# Patient Record
Sex: FEMALE | Race: WHITE | NOT HISPANIC OR LATINO | Employment: STUDENT | URBAN - METROPOLITAN AREA
[De-identification: names, ages, dates, MRNs, and addresses within clinical notes are randomized per-mention and may not be internally consistent; named-entity substitution may affect disease eponyms.]

---

## 2023-09-04 ENCOUNTER — OFFICE VISIT (OUTPATIENT)
Dept: URGENT CARE | Facility: CLINIC | Age: 23
End: 2023-09-04
Payer: COMMERCIAL

## 2023-09-04 VITALS
DIASTOLIC BLOOD PRESSURE: 55 MMHG | RESPIRATION RATE: 19 BRPM | TEMPERATURE: 98 F | OXYGEN SATURATION: 99 % | HEART RATE: 75 BPM | SYSTOLIC BLOOD PRESSURE: 94 MMHG

## 2023-09-04 DIAGNOSIS — L73.1 PSEUDOFOLLICULITIS BARBAE: Primary | ICD-10-CM

## 2023-09-04 PROCEDURE — 99203 OFFICE O/P NEW LOW 30 MIN: CPT | Mod: S$GLB,,, | Performed by: NURSE PRACTITIONER

## 2023-09-04 PROCEDURE — 99203 PR OFFICE/OUTPT VISIT, NEW, LEVL III, 30-44 MIN: ICD-10-PCS | Mod: S$GLB,,, | Performed by: NURSE PRACTITIONER

## 2023-09-04 RX ORDER — HYDROCORTISONE 25 MG/G
CREAM TOPICAL 2 TIMES DAILY
Qty: 28 G | Refills: 0 | Status: SHIPPED | OUTPATIENT
Start: 2023-09-04

## 2023-09-04 NOTE — PATIENT INSTRUCTIONS
Shave hygiene discussed  Apply Warm compresses to affected area  Wash area with Mild soap and water  Apply steroid cream twice daily to affected area  Typical course and duration of illness discussed  Signs and symptoms of worsening discussed  Follow up as needed/with worsening

## 2023-09-04 NOTE — PROGRESS NOTES
Subjective:      Patient ID: Inez Pineda is a 23 y.o. female.    Vitals:  tympanic temperature is 97.5 °F (36.4 °C). Her blood pressure is 94/55 (abnormal) and her pulse is 75. Her respiration is 19 and oxygen saturation is 99%.     Chief Complaint: Rash    23 year old female presents for evaluation of itchy rash to bikini area and inner thighs x 5 days - 1 week post shaving with worsening the past few days. Reports using a new razor and shave cream, hasn't shaved genital region in months. Applying warm compresses. No topical medications applied. 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 Is not sexually active and has never been sexually active/denies oral sex, no concerns for STDs    Rash  This is a new problem. The current episode started in the past 7 days. The problem is unchanged. The rash is characterized by itchiness, dryness and redness. It is unknown if there was an exposure to a precipitant. Pertinent negatives include no anorexia, congestion, cough, diarrhea, eye pain, facial edema, fatigue, fever, joint pain, nail changes, rhinorrhea, shortness of breath, sore throat or vomiting. Past treatments include nothing. The treatment provided no relief.       Constitution: Negative. Negative for fatigue and fever.   HENT: Negative.  Negative for congestion and sore throat.    Neck: neck negative.   Cardiovascular: Negative.    Eyes: Negative.  Negative for eye pain.   Respiratory: Negative.  Negative for cough and shortness of breath.    Gastrointestinal:  Negative for vomiting and diarrhea.   Endocrine: negative.   Genitourinary: Negative.    Musculoskeletal: Negative.    Skin:  Positive for rash and erythema. Negative for bruising and abscess.   Allergic/Immunologic: Positive for itching.   Neurological: Negative.    Hematologic/Lymphatic: Negative.    Psychiatric/Behavioral: Negative.        Objective:     Physical Exam   Constitutional: She is oriented to person, place,  and time. She is cooperative.  Non-toxic appearance. She does not appear ill. No distress. normalawake  HENT:   Head: Normocephalic and atraumatic.   Ears:   Right Ear: Hearing normal.   Left Ear: Hearing normal.   Eyes: Conjunctivae are normal. Pupils are equal, round, and reactive to light. Right eye exhibits no discharge. Left eye exhibits no discharge. No scleral icterus.   Neck: Neck supple.   Cardiovascular: Normal rate.   Pulmonary/Chest: Effort normal.   Abdominal: Normal appearance. flat abdomen   Genitourinary: vaginal rash (erythematous papular/pustular rash to pubic region/inner thighs).   Musculoskeletal: Normal range of motion.         General: Normal range of motion.   Neurological: She is alert and oriented to person, place, and time.   Skin: Skin is warm, dry, intact, not diaphoretic, not pale, rash (erythematous papular/pustular rash noted to pubic region and inner thighs), not urticarial, not nodular, pustular, not purpuric, not macular, not vesicular, papular, not maculopapular and no abscessed. erythema No bruising jaundice  Psychiatric: Her behavior is normal. Mood, judgment and thought content normal.   Nursing note and vitals reviewed.      Assessment:     1. Pseudofolliculitis barbae        Plan:         Patient presents with itchy rash that is consistent with razor bumps/pseudofolliculitis barbae. No concerns for STDs, testing deferred. Plan is to manage symptoms, prevent worsening, and prevent reoccurrence. Discussed with patient who verbalizes understanding.       Pseudofolliculitis barbae  -     hydrocortisone 2.5 % cream; Apply topically 2 (two) times daily.  Dispense: 28 g; Refill: 0                Patient Instructions   Shave hygiene discussed  Apply Warm compresses to affected area  Wash area with Mild soap and water  Apply steroid cream twice daily to affected area  Typical course and duration of illness discussed  Signs and symptoms of worsening discussed  Follow up as needed/with  worsening